# Patient Record
Sex: MALE | Race: WHITE | NOT HISPANIC OR LATINO | Employment: UNEMPLOYED | URBAN - METROPOLITAN AREA
[De-identification: names, ages, dates, MRNs, and addresses within clinical notes are randomized per-mention and may not be internally consistent; named-entity substitution may affect disease eponyms.]

---

## 2023-08-30 ENCOUNTER — HOSPITAL ENCOUNTER (OUTPATIENT)
Dept: DATA CONVERSION | Facility: HOSPITAL | Age: 28
Discharge: ANOTHER HOSPITAL | End: 2023-08-30
Payer: OTHER GOVERNMENT

## 2023-08-30 DIAGNOSIS — T79.7XXA: ICD-10-CM

## 2023-08-30 DIAGNOSIS — S01.01XA LACERATION WITHOUT FOREIGN BODY OF SCALP, INITIAL ENCOUNTER: ICD-10-CM

## 2023-08-30 DIAGNOSIS — S09.90XA UNSPECIFIED INJURY OF HEAD, INITIAL ENCOUNTER: ICD-10-CM
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
## 2023-08-30 LAB
ANION GAP SERPL CALCULATED.3IONS-SCNC: 10 MMOL/L (ref 0–19)
BASOPHILS # BLD AUTO: 0.07 K/UL (ref 0–0.22)
BASOPHILS NFR BLD AUTO: 0.5 % (ref 0–1)
BUN SERPL-MCNC: 18 MG/DL (ref 8–25)
BUN/CREAT SERPL: 25.7 RATIO (ref 8–21)
CALCIUM SERPL-MCNC: 9.7 MG/DL (ref 8.5–10.4)
CHLORIDE SERPL-SCNC: 103 MMOL/L (ref 97–107)
CO2 SERPL-SCNC: 26 MMOL/L (ref 24–31)
CREAT SERPL-MCNC: 0.7 MG/DL (ref 0.4–1.6)
DEPRECATED RDW RBC AUTO: 42 FL (ref 37–54)
DIFFERENTIAL METHOD BLD: ABNORMAL
EOSINOPHIL # BLD AUTO: 0.08 K/UL (ref 0–0.45)
EOSINOPHIL NFR BLD: 0.6 % (ref 0–3)
ERYTHROCYTE [DISTWIDTH] IN BLOOD BY AUTOMATED COUNT: 13.2 % (ref 11.7–15)
GFR SERPL CREATININE-BSD FRML MDRD: 127 ML/MIN/1.73 M2
GLUCOSE SERPL-MCNC: 90 MG/DL (ref 65–99)
HCT VFR BLD AUTO: 43.2 % (ref 41–50)
HGB BLD-MCNC: 14.5 GM/DL (ref 13.5–16.5)
IMM GRANULOCYTES # BLD AUTO: 0.03 K/UL (ref 0–0.1)
LYMPHOCYTES # BLD AUTO: 2.6 K/UL (ref 1.2–3.2)
LYMPHOCYTES NFR BLD MANUAL: 19.9 % (ref 20–40)
MCH RBC QN AUTO: 29.2 PG (ref 26–34)
MCHC RBC AUTO-ENTMCNC: 33.6 % (ref 31–37)
MCV RBC AUTO: 87.1 FL (ref 80–100)
MONOCYTES # BLD AUTO: 1.36 K/UL (ref 0–0.8)
MONOCYTES NFR BLD MANUAL: 10.4 % (ref 0–8)
NEUTROPHILS # BLD AUTO: 8.9 K/UL
NEUTROPHILS # BLD AUTO: 8.9 K/UL (ref 1.8–7.7)
NEUTROPHILS.IMMATURE NFR BLD: 0.2 % (ref 0–1)
NEUTS SEG NFR BLD: 68.4 % (ref 50–70)
NRBC BLD-RTO: 0 /100 WBC
PLATELET # BLD AUTO: 401 K/UL (ref 150–450)
PMV BLD AUTO: 10.5 CU (ref 7–12.6)
POTASSIUM SERPL-SCNC: 3.8 MMOL/L (ref 3.4–5.1)
RBC # BLD AUTO: 4.96 M/UL (ref 4.5–5.5)
SODIUM SERPL-SCNC: 139 MMOL/L (ref 133–145)
WBC # BLD AUTO: 13 K/UL (ref 4.5–11)

## 2023-09-30 ENCOUNTER — HOSPITAL ENCOUNTER (EMERGENCY)
Facility: HOSPITAL | Age: 28
Discharge: HOME | End: 2023-09-30
Attending: FAMILY MEDICINE | Admitting: FAMILY MEDICINE
Payer: OTHER GOVERNMENT

## 2023-09-30 VITALS
BODY MASS INDEX: 25.11 KG/M2 | SYSTOLIC BLOOD PRESSURE: 141 MMHG | HEIGHT: 67 IN | DIASTOLIC BLOOD PRESSURE: 92 MMHG | RESPIRATION RATE: 18 BRPM | WEIGHT: 160 LBS | OXYGEN SATURATION: 97 % | TEMPERATURE: 99.4 F | HEART RATE: 97 BPM

## 2023-09-30 DIAGNOSIS — K52.9 AGE (ACUTE GASTROENTERITIS): ICD-10-CM

## 2023-09-30 DIAGNOSIS — Z53.20 RECOMMENDATION REFUSED BY PATIENT: ICD-10-CM

## 2023-09-30 DIAGNOSIS — J98.8 VIRAL RESPIRATORY ILLNESS: ICD-10-CM

## 2023-09-30 DIAGNOSIS — B97.89 VIRAL RESPIRATORY ILLNESS: ICD-10-CM

## 2023-09-30 DIAGNOSIS — E13.37X2: Primary | ICD-10-CM

## 2023-09-30 DIAGNOSIS — R10.9 ABDOMINAL PAIN IN MALE: ICD-10-CM

## 2023-09-30 PROBLEM — E11.37X2: Status: ACTIVE | Noted: 2023-09-30

## 2023-09-30 LAB
ALBUMIN SERPL BCP-MCNC: 4 G/DL (ref 3.4–5)
ALP SERPL-CCNC: 68 U/L (ref 33–120)
ALT SERPL W P-5'-P-CCNC: 34 U/L (ref 10–52)
ANION GAP SERPL CALC-SCNC: 13 MMOL/L (ref 10–20)
AST SERPL W P-5'-P-CCNC: 33 U/L (ref 9–39)
BASOPHILS # BLD AUTO: 0.02 X10*3/UL (ref 0–0.1)
BASOPHILS NFR BLD AUTO: 0.2 %
BILIRUB SERPL-MCNC: 0.4 MG/DL (ref 0–1.2)
BUN SERPL-MCNC: 14 MG/DL (ref 6–23)
CALCIUM SERPL-MCNC: 8.5 MG/DL (ref 8.6–10.3)
CHLORIDE SERPL-SCNC: 106 MMOL/L (ref 98–107)
CO2 SERPL-SCNC: 24 MMOL/L (ref 21–32)
CREAT SERPL-MCNC: 0.85 MG/DL (ref 0.5–1.3)
EOSINOPHIL # BLD AUTO: 0 X10*3/UL (ref 0–0.7)
EOSINOPHIL NFR BLD AUTO: 0 %
ERYTHROCYTE [DISTWIDTH] IN BLOOD BY AUTOMATED COUNT: 13.6 % (ref 11.5–14.5)
GFR SERPL CREATININE-BSD FRML MDRD: >90 ML/MIN/1.73M*2
GLUCOSE SERPL-MCNC: 96 MG/DL (ref 74–99)
HCT VFR BLD AUTO: 38.7 % (ref 41–52)
HGB BLD-MCNC: 12.6 G/DL (ref 13.5–17.5)
IMM GRANULOCYTES # BLD AUTO: 0.04 X10*3/UL (ref 0–0.7)
IMM GRANULOCYTES NFR BLD AUTO: 0.3 % (ref 0–0.9)
LIPASE SERPL-CCNC: 12 U/L (ref 9–82)
LYMPHOCYTES # BLD AUTO: 1.2 X10*3/UL (ref 1.2–4.8)
LYMPHOCYTES NFR BLD AUTO: 10.4 %
MCH RBC QN AUTO: 29.2 PG (ref 26–34)
MCHC RBC AUTO-ENTMCNC: 32.6 G/DL (ref 32–36)
MCV RBC AUTO: 90 FL (ref 80–100)
MONOCYTES # BLD AUTO: 1.87 X10*3/UL (ref 0.1–1)
MONOCYTES NFR BLD AUTO: 16.3 %
NEUTROPHILS # BLD AUTO: 8.37 X10*3/UL (ref 1.2–7.7)
NEUTROPHILS NFR BLD AUTO: 72.8 %
NRBC BLD-RTO: 0 /100 WBCS (ref 0–0)
PLATELET # BLD AUTO: 202 X10*3/UL (ref 150–450)
PMV BLD AUTO: 10.4 FL (ref 7.5–11.5)
POTASSIUM SERPL-SCNC: 3.8 MMOL/L (ref 3.5–5.3)
PROT SERPL-MCNC: 6.9 G/DL (ref 6.4–8.2)
RBC # BLD AUTO: 4.31 X10*6/UL (ref 4.5–5.9)
SODIUM SERPL-SCNC: 139 MMOL/L (ref 136–145)
WBC # BLD AUTO: 11.5 X10*3/UL (ref 4.4–11.3)

## 2023-09-30 PROCEDURE — 83690 ASSAY OF LIPASE: CPT | Performed by: FAMILY MEDICINE

## 2023-09-30 PROCEDURE — 80053 COMPREHEN METABOLIC PANEL: CPT | Performed by: FAMILY MEDICINE

## 2023-09-30 PROCEDURE — 87800 DETECT AGNT MULT DNA DIREC: CPT | Mod: CMCLAB,GENLAB | Performed by: FAMILY MEDICINE

## 2023-09-30 PROCEDURE — 2500000004 HC RX 250 GENERAL PHARMACY W/ HCPCS (ALT 636 FOR OP/ED): Performed by: FAMILY MEDICINE

## 2023-09-30 PROCEDURE — 85025 COMPLETE CBC W/AUTO DIFF WBC: CPT | Performed by: FAMILY MEDICINE

## 2023-09-30 PROCEDURE — 99284 EMERGENCY DEPT VISIT MOD MDM: CPT | Performed by: FAMILY MEDICINE

## 2023-09-30 PROCEDURE — 36415 COLL VENOUS BLD VENIPUNCTURE: CPT | Performed by: FAMILY MEDICINE

## 2023-09-30 PROCEDURE — 2500000001 HC RX 250 WO HCPCS SELF ADMINISTERED DRUGS (ALT 637 FOR MEDICARE OP)

## 2023-09-30 RX ORDER — ONDANSETRON HYDROCHLORIDE 2 MG/ML
INJECTION, SOLUTION INTRAVENOUS
Status: DISCONTINUED
Start: 2023-09-30 | End: 2023-09-30 | Stop reason: HOSPADM

## 2023-09-30 RX ORDER — ONDANSETRON HYDROCHLORIDE 2 MG/ML
4 INJECTION, SOLUTION INTRAVENOUS ONCE
Status: COMPLETED | OUTPATIENT
Start: 2023-09-30 | End: 2023-09-30

## 2023-09-30 RX ORDER — ACETAMINOPHEN 325 MG/1
TABLET ORAL
Status: COMPLETED
Start: 2023-09-30 | End: 2023-09-30

## 2023-09-30 RX ORDER — ACETAMINOPHEN 650 MG/1
650 SUPPOSITORY RECTAL ONCE
Status: DISCONTINUED | OUTPATIENT
Start: 2023-09-30 | End: 2023-09-30 | Stop reason: HOSPADM

## 2023-09-30 RX ADMIN — ACETAMINOPHEN 650 MG: 325 TABLET, FILM COATED ORAL at 14:25

## 2023-09-30 RX ADMIN — ONDANSETRON 4 MG: 2 INJECTION INTRAMUSCULAR; INTRAVENOUS at 10:30

## 2023-09-30 ASSESSMENT — COLUMBIA-SUICIDE SEVERITY RATING SCALE - C-SSRS
6. HAVE YOU EVER DONE ANYTHING, STARTED TO DO ANYTHING, OR PREPARED TO DO ANYTHING TO END YOUR LIFE?: NO
2. HAVE YOU ACTUALLY HAD ANY THOUGHTS OF KILLING YOURSELF?: NO
1. IN THE PAST MONTH, HAVE YOU WISHED YOU WERE DEAD OR WISHED YOU COULD GO TO SLEEP AND NOT WAKE UP?: NO

## 2023-09-30 ASSESSMENT — PAIN SCALES - GENERAL: PAINLEVEL_OUTOF10: 0 - NO PAIN

## 2023-09-30 NOTE — ED PROVIDER NOTES
HPI   No chief complaint on file.  Cough congestion sore throat body ache nausea and vomiting    HPI this 28-year male patient came to the emergency room complaining of sore throat cough congestion and body aches symptoms started about 3 days ago was seen here 2 days ago he has COVID-19 test as well as a strep test done which was negative.  Patient continues symptoms also complaining of unable to keep his down because of vomiting and was evaluated and treated.  He also wanted be tested for sexually transmitted disease but denies any dysuria frequency urgency any urethral discharge or genital region.  Lesion, testicular pain or swelling or adenopathy.  He admits some abdominal cramps but denies any persistent abdominal pain denies any diarrhea or blood in stool vascular vomiting blood.  Denies throat tightness tissue swelling neck stiffness continues to cough but denies any chest pain or shortness of hemoptysis of pain or swelling lower EXTR distal paresthesia hospitalization history of blood clot.                No data recorded                Patient History   Past Medical History:   Diagnosis Date    Emotional lability     Mood change    Encounter for immunization 02/25/2019    Need for vaccination    Headache, unspecified 02/25/2019    New onset headache    Migraine, unspecified, not intractable, without status migrainosus 02/25/2019    Migraine    Other specified disorders of nose and nasal sinuses 02/26/2019    Nasal vestibulitis    Personal history of other diseases of the musculoskeletal system and connective tissue     History of tendinitis    Personal history of other diseases of the nervous system and sense organs     History of sleep apnea    Personal history of other specified conditions 02/25/2019    History of palpitations    Personal history of other specified conditions 02/25/2019    History of shortness of breath     Past Surgical History:   Procedure Laterality Date    OTHER SURGICAL HISTORY   02/25/2019    Tonsillectomy     No family history on file.  Social History     Tobacco Use    Smoking status: Not on file    Smokeless tobacco: Not on file   Substance Use Topics    Alcohol use: Not on file    Drug use: Not on file       Physical Exam   ED Triage Vitals [09/30/23 0844]   Temp Heart Rate Resp BP   36.9 °C (98.4 °F) 97 18 (!) 141/92      SpO2 Temp Source Heart Rate Source Patient Position   97 % Oral Monitor --      BP Location FiO2 (%)     Left arm --       Physical Exam  Constitutional:       Appearance: Normal appearance.   HENT:      Head: Normocephalic and atraumatic.      Right Ear: External ear normal.      Left Ear: External ear normal.      Nose: Nose normal.      Mouth/Throat:      Mouth: Mucous membranes are moist.      Pharynx: No posterior oropharyngeal erythema.   Eyes:      Extraocular Movements: Extraocular movements intact.      Conjunctiva/sclera: Conjunctivae normal.      Pupils: Pupils are equal, round, and reactive to light.   Cardiovascular:      Rate and Rhythm: Normal rate and regular rhythm.      Comments: Heart regular rate and rhythm.  No murmurs auscultated.  No friction rub no JVD good peripheral pulses Present nontender Homans' sign negative.  Good knee motion on the left.  No nuchal rigidity no bruises or ecchymosis no petechiae.  Intact neurovascular function.  Cap refill less than 2 seconds  Pulmonary:      Effort: Pulmonary effort is normal.      Breath sounds: Normal breath sounds.      Comments: No evidence of injury chest abdomen or pelvis.  Lungs are clear, no wheezing, rales or rhonchi no tachypnea hypoxemia no Erythematous emphysema good breath sounds bilaterally breathing comfortably no hypoxemia good skin perfusion.  Clear breath sound bilaterally.  Abdominal:      General: Abdomen is flat.      Palpations: Abdomen is soft.      Comments: I carefully examined her abdomen abdomen soft nondistended positive bowel sounds no focal isolated tenderness, no guarding,  rebound surgery no CVA tenderness.   Genitourinary:     Comments: Denies genital discharge or bleeding any trauma to genitalia or any testicular pain or swelling.  No pelvic or genital examination was done.  Musculoskeletal:         General: Normal range of motion.      Cervical back: Normal range of motion and neck supple. No rigidity or tenderness.      Comments: Good range of motion of the left calf.  Nontender Homans' sign negative intact distal pulse intact sensation with joint edema edema no rashes.  Spine nontender.  Patient walks with a steady gait.   Skin:     General: Skin is warm and dry.      Capillary Refill: Capillary refill takes less than 2 seconds.   Neurological:      General: No focal deficit present.      Mental Status: He is alert and oriented to person, place, and time.      Comments: Cervical, thoracic and lumbar spine nontender neck was supple no extremity splints.  No facial drooping or drooling.  Good motion of the leg.   Psychiatric:         Mood and Affect: Mood normal.         Behavior: Behavior normal.       Upon examination vital signs stable he was afebrile pulse ox 97% room air.  He was awake alert pleasant cooperative.  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  He felt remarkably better after IV fluids his white count is elevated at 1 with a CT abdomen but patient said that he has CAT scan abdomen pelvis done and other imaging done 2 weeks ago does not want any CAT scan understands risk and benefits well.   He also refused strep throat COVID-19 retesting.  After IV hydration he feeling remarkable better and want to be discharged and subsequently decided to leave while we were working on different patient and tried to convince her to have CAT scan done but he decided to leave.  After Tylenol he left the premises I found out about it after patient left he told the nursing staff he will return to ER if symptoms get worse or if symptom does not resolve completely.  Procedure  Procedures     Chauncey Virgen MD  09/30/23 4741       Chauncey Virgen MD  09/30/23 1452       Chauncey Virgen MD  09/30/23 8749

## 2023-09-30 NOTE — ED TRIAGE NOTES
Pt reports vomiting and fever since Wednesday. He reports he was here Thursday and they swabbed him for covid and it was neg.

## 2023-10-03 LAB
C TRACH RRNA SPEC QL NAA+PROBE: NEGATIVE
N GONORRHOEA DNA SPEC QL PROBE+SIG AMP: NEGATIVE

## 2024-11-19 ENCOUNTER — OFFICE VISIT (OUTPATIENT)
Dept: URGENT CARE | Age: 29
End: 2024-11-19
Payer: OTHER GOVERNMENT

## 2024-11-19 VITALS
OXYGEN SATURATION: 97 % | TEMPERATURE: 98.3 F | HEART RATE: 62 BPM | WEIGHT: 180.56 LBS | SYSTOLIC BLOOD PRESSURE: 134 MMHG | DIASTOLIC BLOOD PRESSURE: 77 MMHG | BODY MASS INDEX: 28.34 KG/M2 | HEIGHT: 67 IN | RESPIRATION RATE: 16 BRPM

## 2024-11-19 DIAGNOSIS — H65.03 NON-RECURRENT ACUTE SEROUS OTITIS MEDIA OF BOTH EARS: Primary | ICD-10-CM

## 2024-11-19 DIAGNOSIS — J06.9 ACUTE UPPER RESPIRATORY INFECTION: ICD-10-CM

## 2024-11-19 DIAGNOSIS — J02.9 SORE THROAT: ICD-10-CM

## 2024-11-19 DIAGNOSIS — R50.9 FEVER, UNSPECIFIED FEVER CAUSE: ICD-10-CM

## 2024-11-19 LAB
POC RAPID INFLUENZA A: NEGATIVE
POC RAPID INFLUENZA B: NEGATIVE
POC RAPID STREP: NEGATIVE
POC SARS-COV-2 AG BINAX: NORMAL

## 2024-11-19 RX ORDER — AMOXICILLIN 875 MG/1
875 TABLET, FILM COATED ORAL 2 TIMES DAILY
Qty: 14 TABLET | Refills: 0 | Status: SHIPPED | OUTPATIENT
Start: 2024-11-19 | End: 2024-11-19

## 2024-11-19 RX ORDER — AMOXICILLIN 875 MG/1
875 TABLET, FILM COATED ORAL 2 TIMES DAILY
Qty: 14 TABLET | Refills: 0 | Status: SHIPPED | OUTPATIENT
Start: 2024-11-19 | End: 2024-11-26

## 2024-11-20 ASSESSMENT — ENCOUNTER SYMPTOMS
SORE THROAT: 0
VOMITING: 0
SHORTNESS OF BREATH: 0
WHEEZING: 0
DIARRHEA: 0
FEVER: 1
FATIGUE: 0
COUGH: 1
CHILLS: 0
STRIDOR: 0
CHEST TIGHTNESS: 0
SINUS PRESSURE: 0
FLANK PAIN: 0
ABDOMINAL PAIN: 0
DIZZINESS: 0

## 2024-11-20 NOTE — PROGRESS NOTES
Subjective   Patient ID: Glenn Rodriges is a 29 y.o. male. They present today with a chief complaint of Other (Body aches), Fever, and Cough (X 3 days).    History of Present Illness  29-year-old male presenting to the clinic with complaints of bodyaches, fever, cough, nasal congestion with maxillary sinus pressure.  Symptoms have been present for around 3 to 4 days now.  Patient would like evaluation.  Patient believes he needs an antibiotic.  Patient denies any chest pain or shortness of breath.  Patient denies any other acute ROS at this time.          Past Medical History  Allergies as of 11/19/2024    (No Known Allergies)       (Not in a hospital admission)       Past Medical History:   Diagnosis Date    Emotional lability     Mood change    Encounter for immunization 02/25/2019    Need for vaccination    Headache, unspecified 02/25/2019    New onset headache    Migraine, unspecified, not intractable, without status migrainosus 02/25/2019    Migraine    Other specified disorders of nose and nasal sinuses 02/26/2019    Nasal vestibulitis    Personal history of other diseases of the musculoskeletal system and connective tissue     History of tendinitis    Personal history of other diseases of the nervous system and sense organs     History of sleep apnea    Personal history of other specified conditions 02/25/2019    History of palpitations    Personal history of other specified conditions 02/25/2019    History of shortness of breath       Past Surgical History:   Procedure Laterality Date    OTHER SURGICAL HISTORY  02/25/2019    Tonsillectomy            Review of Systems  Review of Systems   Constitutional:  Positive for fever. Negative for chills and fatigue.   HENT:  Positive for congestion and postnasal drip. Negative for ear discharge, ear pain, sinus pressure and sore throat.    Respiratory:  Positive for cough. Negative for chest tightness, shortness of breath, wheezing and stridor.    Cardiovascular:   "Negative for chest pain.   Gastrointestinal:  Negative for abdominal pain, diarrhea and vomiting.   Genitourinary:  Negative for flank pain.   Neurological:  Negative for dizziness.                                  Objective    Vitals:    11/19/24 1842   BP: 134/77   BP Location: Left arm   Patient Position: Sitting   BP Cuff Size: Adult   Pulse: 62   Resp: 16   Temp: 36.8 °C (98.3 °F)   TempSrc: Oral   SpO2: 97%   Weight: 81.9 kg (180 lb 8.9 oz)   Height: 1.702 m (5' 7\")     No LMP for male patient.    Physical Exam  Constitutional:       General: He is not in acute distress.     Appearance: Normal appearance. He is not ill-appearing or toxic-appearing.   HENT:      Head: Normocephalic and atraumatic.      Right Ear: Ear canal and external ear normal.      Left Ear: Ear canal and external ear normal.      Ears:      Comments: Bilateral Tympanic membrane with bulging and erythema. No TM rupture. Mastoid processes bilaterally normal     Mouth/Throat:      Mouth: Mucous membranes are moist.   Eyes:      General: Lids are normal.   Cardiovascular:      Rate and Rhythm: Normal rate and regular rhythm.      Heart sounds: Normal heart sounds, S1 normal and S2 normal. No murmur heard.     No friction rub. No gallop.   Pulmonary:      Effort: Pulmonary effort is normal. No respiratory distress.      Breath sounds: Normal breath sounds and air entry. No stridor. No decreased breath sounds, wheezing, rhonchi or rales.   Neurological:      General: No focal deficit present.      Mental Status: He is alert and oriented to person, place, and time.         Procedures    Point of Care Test & Imaging Results from this visit  Results for orders placed or performed in visit on 11/19/24   POCT Covid-19 Rapid Antigen   Result Value Ref Range    POC JULI-COV-2 AG  Presumptive negative test for SARS-CoV-2 (no antigen detected)     Presumptive negative test for SARS-CoV-2 (no antigen detected)   POCT Influenza A/B manually resulted "   Result Value Ref Range    POC Rapid Influenza A Negative Negative    POC Rapid Influenza B Negative Negative   POCT rapid strep A manually resulted   Result Value Ref Range    POC Rapid Strep Negative Negative      No results found.    Diagnostic study results (if any) were reviewed by [facility].    Assessment/Plan   Allergies, medications, history, and pertinent labs/EKGs/Imaging reviewed by José Harvey PA-C.     Medical Decision Making  29-year-old male presenting to the clinic with complaints of bodyaches, fever, cough, nasal congestion with maxillary sinus pressure.  Symptoms have been present for around 3 to 4 days now.  Patient would like evaluation.  Patient believes he needs an antibiotic.  Patient denies any chest pain or shortness of breath.  Patient denies any other acute ROS at this time.  Vital signs in the clinic are stable.  Patient to be treated with amoxicillin for bilateral otitis media.  COVID, flu, strep negative. Discharge instructions. Please follow up with your Primary Care Physician within the next 5-7 days. It is important to take prescriptions as prescribed and complete all antibiotics. If your symptoms worsen you are instructed to immediately go to the emergency room for reevaluation and further assessment. If you develop any chest pain, SOB, or difficulty breathing you are instructed to go to the emergency room for reevaluation. All discharge instructions will be provided and explained to the patient at discharge. If you have any questions regarding your treatment plan please call the [facility] urgent care clinic.     Orders and Diagnoses  Diagnoses and all orders for this visit:  Sore throat  -     POCT rapid strep A manually resulted  Fever, unspecified fever cause  -     POCT Covid-19 Rapid Antigen  -     POCT Influenza A/B manually resulted      Medical Admin Record      Patient disposition: Home    Electronically signed by [facility]  7:15  PM

## 2024-11-20 NOTE — PATIENT INSTRUCTIONS
Discharge instructions    Please follow up with your Primary Care Physician within the next 5-7 days.    It is important to take prescriptions as prescribed and complete all antibiotics.     If your symptoms worsen you are instructed to immediately go to the emergency room for reevaluation and further assessment.    If you develop any chest pain, SOB, or difficulty breathing you are instructed to go to the emergency room for reevaluation.    All discharge instructions will be provided and explained to the patient at discharge.    If you have any questions regarding your treatment plan please call the [facility] urgent care clinic.

## 2025-02-24 ENCOUNTER — HOSPITAL ENCOUNTER (EMERGENCY)
Facility: HOSPITAL | Age: 30
Discharge: HOME | End: 2025-02-24
Attending: EMERGENCY MEDICINE
Payer: COMMERCIAL

## 2025-02-24 VITALS
OXYGEN SATURATION: 98 % | TEMPERATURE: 100.1 F | BODY MASS INDEX: 28.25 KG/M2 | HEART RATE: 110 BPM | RESPIRATION RATE: 18 BRPM | HEIGHT: 67 IN | DIASTOLIC BLOOD PRESSURE: 78 MMHG | SYSTOLIC BLOOD PRESSURE: 127 MMHG | WEIGHT: 180 LBS

## 2025-02-24 DIAGNOSIS — R52 BODY ACHES: ICD-10-CM

## 2025-02-24 DIAGNOSIS — R05.1 ACUTE COUGH: ICD-10-CM

## 2025-02-24 DIAGNOSIS — J11.1 INFLUENZA: Primary | ICD-10-CM

## 2025-02-24 PROCEDURE — 99283 EMERGENCY DEPT VISIT LOW MDM: CPT | Performed by: EMERGENCY MEDICINE

## 2025-02-24 RX ORDER — ONDANSETRON 4 MG/1
4 TABLET, ORALLY DISINTEGRATING ORAL EVERY 8 HOURS PRN
Qty: 20 TABLET | Refills: 0 | Status: SHIPPED | OUTPATIENT
Start: 2025-02-24 | End: 2025-03-03

## 2025-02-24 RX ORDER — OSELTAMIVIR PHOSPHATE 75 MG/1
75 CAPSULE ORAL EVERY 12 HOURS
Qty: 10 CAPSULE | Refills: 0 | Status: SHIPPED | OUTPATIENT
Start: 2025-02-24 | End: 2025-03-01

## 2025-02-24 RX ORDER — BROMPHENIRAMINE MALEATE, PSEUDOEPHEDRINE HYDROCHLORIDE, AND DEXTROMETHORPHAN HYDROBROMIDE 2; 30; 10 MG/5ML; MG/5ML; MG/5ML
5 SYRUP ORAL 4 TIMES DAILY PRN
Qty: 120 ML | Refills: 0 | Status: SHIPPED | OUTPATIENT
Start: 2025-02-24 | End: 2025-03-06

## 2025-02-24 ASSESSMENT — PAIN DESCRIPTION - LOCATION: LOCATION: GENERALIZED

## 2025-02-24 ASSESSMENT — PAIN DESCRIPTION - DESCRIPTORS: DESCRIPTORS: ACHING

## 2025-02-24 ASSESSMENT — PAIN - FUNCTIONAL ASSESSMENT: PAIN_FUNCTIONAL_ASSESSMENT: 0-10

## 2025-02-24 ASSESSMENT — PAIN DESCRIPTION - PAIN TYPE: TYPE: ACUTE PAIN

## 2025-02-24 ASSESSMENT — PAIN SCALES - GENERAL: PAINLEVEL_OUTOF10: 6

## 2025-02-24 NOTE — ED PROVIDER NOTES
HPI   Chief Complaint   Patient presents with    Flu Symptoms     Pt here with complaints of flu symptoms. He has a cough, runny nose, headache, body aches, fevers since last evening. Denies chest pain or shortness of breath. Pt says everyone in his house has tested positive for influenza A a few days ago.       29-year-old male with flulike symptoms.  Complaining of bodyaches runny nose headache cough and fevers since yesterday evening 2/23.  His spouse and children tested positive for influenza A a few days ago.  Nonproductive cough.  He is a smoker.  Otherwise no respiratory issues.      History provided by:  Patient and medical records          Patient History   Past Medical History:   Diagnosis Date    Emotional lability     Mood change    Encounter for immunization 02/25/2019    Need for vaccination    Headache, unspecified 02/25/2019    New onset headache    Migraine, unspecified, not intractable, without status migrainosus 02/25/2019    Migraine    Other specified disorders of nose and nasal sinuses 02/26/2019    Nasal vestibulitis    Personal history of other diseases of the musculoskeletal system and connective tissue     History of tendinitis    Personal history of other diseases of the nervous system and sense organs     History of sleep apnea    Personal history of other specified conditions 02/25/2019    History of palpitations    Personal history of other specified conditions 02/25/2019    History of shortness of breath     Past Surgical History:   Procedure Laterality Date    OTHER SURGICAL HISTORY  02/25/2019    Tonsillectomy     No family history on file.  Social History     Tobacco Use    Smoking status: Former     Types: Cigarettes    Smokeless tobacco: Never   Substance Use Topics    Alcohol use: Not Currently    Drug use: Yes     Types: Marijuana       Physical Exam   ED Triage Vitals [02/24/25 0849]   Temperature Heart Rate Respirations BP   37.8 °C (100.1 °F) (!) 110 18 127/78      Pulse Ox  Temp Source Heart Rate Source Patient Position   98 % Oral Monitor --      BP Location FiO2 (%)     -- --       Physical Exam  Vitals and nursing note reviewed.   Constitutional:       General: He is not in acute distress.     Appearance: He is well-developed.   HENT:      Head: Normocephalic and atraumatic.      Nose: Congestion present.   Eyes:      Conjunctiva/sclera: Conjunctivae normal.   Cardiovascular:      Rate and Rhythm: Regular rhythm. Tachycardia present.      Heart sounds: No murmur heard.  Pulmonary:      Effort: Pulmonary effort is normal. No respiratory distress.      Breath sounds: Normal breath sounds.   Abdominal:      Palpations: Abdomen is soft.      Tenderness: There is no abdominal tenderness.   Musculoskeletal:         General: No swelling.      Cervical back: Neck supple.   Skin:     General: Skin is warm and dry.      Capillary Refill: Capillary refill takes less than 2 seconds.   Neurological:      Mental Status: He is alert.   Psychiatric:         Mood and Affect: Mood normal.           ED Course & MDM   ED Course as of 02/24/25 0903   Mon Feb 24, 2025   0902 29-year-old male with flulike symptoms.  Presumed influenza A given the multiple other family members have tested positive for flu.  Lungs are clear.  No hypoxia.  Doubt pneumonia.  Symptoms began on 2/23.  He is tolerating oral intake.  I considered admission.  Given presentation, he can be treated on an outpatient basis for influenza A.  Prescribed Tamiflu Bromfed-DM and Zofran.  PCP follow-up.  Return with worsening shortness of breath vomiting or any other concerns.  He agrees with this plan and verbalized understanding.    Of note, he was tachycardic secondary to influenza.    Discharged home. [BT]      ED Course User Index  [BT] Ayden Boudreaux DO         Diagnoses as of 02/24/25 0903   Influenza   Acute cough   Body aches                 No data recorded     [place] Coma Scale Score: 15 (02/24/25 0850 : Bernie Badillo  RN)                           Medical Decision Making      Procedure  Procedures     Ayden Boudreaux, DO  02/24/25 0903

## 2025-02-24 NOTE — ED TRIAGE NOTES
Pt here with complaints of flu symptoms. He has a cough, runny nose, headache, body aches, fevers since last evening. Denies chest pain or shortness of breath. Pt says everyone in his house has tested positive for influenza A a few days ago.